# Patient Record
Sex: MALE | Race: WHITE
[De-identification: names, ages, dates, MRNs, and addresses within clinical notes are randomized per-mention and may not be internally consistent; named-entity substitution may affect disease eponyms.]

---

## 2018-01-04 ENCOUNTER — HOSPITAL ENCOUNTER (EMERGENCY)
Dept: HOSPITAL 47 - EC | Age: 53
Discharge: HOME | End: 2018-01-04
Payer: COMMERCIAL

## 2018-01-04 VITALS
TEMPERATURE: 97.5 F | SYSTOLIC BLOOD PRESSURE: 135 MMHG | RESPIRATION RATE: 16 BRPM | HEART RATE: 72 BPM | DIASTOLIC BLOOD PRESSURE: 85 MMHG

## 2018-01-04 DIAGNOSIS — Z23: ICD-10-CM

## 2018-01-04 DIAGNOSIS — T54.3X1A: Primary | ICD-10-CM

## 2018-01-04 PROCEDURE — 90715 TDAP VACCINE 7 YRS/> IM: CPT

## 2018-01-04 PROCEDURE — 99283 EMERGENCY DEPT VISIT LOW MDM: CPT

## 2018-01-04 PROCEDURE — 90471 IMMUNIZATION ADMIN: CPT
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
## 2021-08-31 ENCOUNTER — HOSPITAL ENCOUNTER (OUTPATIENT)
Dept: HOSPITAL 47 - RADXRMAIN | Age: 56
Discharge: HOME | End: 2021-08-31
Attending: FAMILY MEDICINE
Payer: COMMERCIAL

## 2021-08-31 DIAGNOSIS — Z86.16: ICD-10-CM

## 2021-08-31 DIAGNOSIS — R06.02: Primary | ICD-10-CM

## 2021-08-31 PROCEDURE — 71046 X-RAY EXAM CHEST 2 VIEWS: CPT

## 2021-08-31 NOTE — XR
EXAMINATION TYPE: XR chest 2V

 

DATE OF EXAM: 8/31/2021

 

COMPARISON: NONE

 

HISTORY: Covid pneumonia 8 months prior, decreased lung capacity on pulmonary function tests, shortne
ss of breath

 

TECHNIQUE:  Frontal and lateral views of the chest are obtained.

 

FINDINGS:  There is some strand-like increased attenuation at the right lung base. Patient is rotated
. Question some nodularity in the left upper lobe There is no evident pneumothorax or pleural effusio
n. Cardiac mediastinal silhouette is within normal limits.

 

IMPRESSION:  There may be some basilar atelectasis or scarring. Possible lung nodules left upper lobe
. Consider chest CT.

 

A Yellow level critical message alert has been initiated for Jeff Fuentes DO via the [facility] 
Critical Results System on 8/31/2021 12:21 PM.  This message alert has been sent to Jeff Fuentes DO vi
a the preferences provided by the clinician for the receipt of Radiology Critical Findings. Message I
D [id number]. .

## 2021-09-13 ENCOUNTER — HOSPITAL ENCOUNTER (OUTPATIENT)
Dept: HOSPITAL 47 - RADCTMAIN | Age: 56
Discharge: HOME | End: 2021-09-13
Attending: FAMILY MEDICINE
Payer: COMMERCIAL

## 2021-09-13 DIAGNOSIS — R91.8: ICD-10-CM

## 2021-09-13 DIAGNOSIS — I31.3: ICD-10-CM

## 2021-09-13 DIAGNOSIS — U07.1: Primary | ICD-10-CM

## 2021-09-13 PROCEDURE — 71260 CT THORAX DX C+: CPT

## 2021-09-13 NOTE — CT
EXAMINATION TYPE: CT chest w con

 

DATE OF EXAM: 9/13/2021

 

COMPARISON: Chest x-ray August 31, 2021

 

HISTORY: SOB.  Cough.  Positive COVID diagnosis in December 2020

 

CT DLP: 394.30 mGycm.   Automated Exposure Control for Dose Reduction was Utilized.

 

TECHNIQUE:  CT scan of the thorax is performed following with IV Contrast, patient injected with 100 
ml mL of Isovue 300.  

 

FINDINGS:

 

LUNGS: There is mild to moderate bilateral nodularity along the pleura in the lung bases. There is mo
re focal elongated nodular pleural thickening or more likely subpleural nodular consolidation axial i
mage 38 at 2.6 x 0.8 cm. There are additional scattered pulmonary nodules bilaterally. For references
 a 6 mm left mid lung nodule axial image 25. 4) a 5 to 6 mm lateral left upper lobe nodule axial imag
e 19. There is a 8 x 6 mm spiculated nodule or scarlike opacity in the left upper lobe axial image 16
. For reference a 6 x 4 mm right mid lung nodule axial image 29. No pleural effusion or pneumothorax 
seen bilaterally. Tracheobronchial tree is patent.

 

MEDIASTINUM: There are calcified right tracheobronchial lymph nodes. There are prominent noncalcified
 and calcified right hilar lymph nodes. There is prominent noncalcified left hilar and subcarinal lym
ph nodes. For reference there is subcarinal lymph node measuring 2.2 x 1.3 cm axial image 26.

 

No cardiomegaly. Tiny pericardial effusion is seen.  There is left-sided arch with aberrant right bra
chiocephalic artery running posterior to the esophagus, normal variant. There is near 1.0 cm mid pole
 left thyroid hypodense nodule coronal image 49 midpole level.

 

OTHER: There is thin walled 2.3 cm cyst posteriorly right hepatic lobe axial image 56 and 2.5 cm post
erior liver axial image 65. Moderate right lateral spurring midthoracic spine.

 

IMPRESSION: Unusual pattern with peripheral reticular and reticulonodular appearance just above the d
iaphragm with multiple subpleural nodules and scattered pulmonary nodules bilaterally. There are prom
inent hypodense and hyperdense thoracic lymph nodes. Metastatic disease cannot be excluded. Correlate
 clinically. Consider PET CT follow-up.

## 2021-11-05 ENCOUNTER — HOSPITAL ENCOUNTER (OUTPATIENT)
Dept: HOSPITAL 47 - RADPETMAIN | Age: 56
Discharge: HOME | End: 2021-11-05
Attending: INTERNAL MEDICINE
Payer: COMMERCIAL

## 2021-11-05 DIAGNOSIS — R91.8: Primary | ICD-10-CM

## 2021-11-05 PROCEDURE — 78815 PET IMAGE W/CT SKULL-THIGH: CPT

## 2021-11-08 NOTE — PE
EXAMINATION TYPE: PET CT fusion skull to thigh

 

DATE OF EXAM: 11/5/2021

 

COMPARISON: Chest CT September 13, 2021

 

HISTORY: Abnormal CT. Solitary pulmonary nodule.   

 

TECHNIQUE:  Following the intravenous administration of 9.85 mCi of F-18 FDG, whole body images are p
erformed from the skull base to the midthigh.  Images are reviewed on the computer in the coronal, ax
ial, and sagittal planes.  Reconstructed rotating images are created on independent workstation and r
eviewed on the computer.   A localization and attenuation correction CT is performed in conjunction w
ith the PET scan. Blood glucose level equals 108.

 

SCAN: Initial Scan

 

FINDINGS: 

 

SKULL BASE AND NECK:  No areas of abnormal hypermetabolic uptake. 

 

CHEST, MEDIASTINUM, AND HILAR REGION: Persistent scattered small nodules and atelectatic changes in t
he lower lungs with more nodular appearance just above the diaphragms. For reference there is 15 by 8
mm nodule or nodular consolidation medial left lower lobe axial image 109 with mild hypermetabolic up
take. 

 

No suspicious hypermetabolic uptake in thoracic lymph nodes.

 

ABDOMEN AND PELVIS: Abnormal 1.5 x 1.3 cm lymph node just inferior to caudate lobe of liver near leve
l of pancreatic head and portal venous confluence axial image 140, max SUV is 5.49. Abnormal lymph no
de inferior and to right of this between pancreatic duodenal groove measuring 1.5 x 1.3 cm axial imag
e 145, max SUV is 4.8.

 

OSSEOUS STRUCTURES: No abnormal hypermetabolic uptake.

 

OTHER CT: Persistent cardiomegaly. Persistent left arch with aberrant right brachiocephalic artery ru
nning posterior to the esophagus. Calcified thoracic lymph nodes redemonstrated. Low lung volumes red
emonstrated.

 

Liver is heterogeneously hypodense consistent with diffuse fatty infiltration. A few benign thin-wall
ed cysts scattered throughout the liver are redemonstrated. Sigmoid colonic diverticulosis. Mildly en
larged prostate with central calcifications.

 

IMPRESSION: Persistent lower lung nodularity worrisome for metastatic disease. There are 2 abnormal l
ymph nodes in the upper to mid abdomen periPancreatic region.

## 2023-07-06 ENCOUNTER — HOSPITAL ENCOUNTER (OUTPATIENT)
Dept: HOSPITAL 47 - ORWHC2ENDO | Age: 58
Discharge: HOME | End: 2023-07-06
Attending: SURGERY
Payer: COMMERCIAL

## 2023-07-06 VITALS — DIASTOLIC BLOOD PRESSURE: 89 MMHG | SYSTOLIC BLOOD PRESSURE: 135 MMHG | HEART RATE: 57 BPM

## 2023-07-06 VITALS — TEMPERATURE: 96.9 F

## 2023-07-06 VITALS — RESPIRATION RATE: 16 BRPM

## 2023-07-06 DIAGNOSIS — Z79.899: ICD-10-CM

## 2023-07-06 DIAGNOSIS — Z12.11: Primary | ICD-10-CM

## 2023-07-06 DIAGNOSIS — K57.30: ICD-10-CM

## 2023-07-06 DIAGNOSIS — I10: ICD-10-CM

## 2023-07-06 PROCEDURE — 45378 DIAGNOSTIC COLONOSCOPY: CPT

## 2023-07-06 NOTE — P.OP
Date of Procedure: 07/06/23


Preoperative Diagnosis: 


Screening colonoscopy


Postoperative Diagnosis: 


Diverticulosis


Procedure(s) Performed: 


Colonoscopy


Anesthesia: MAC


Surgeon: Tien Mcmahon


Pathology: none sent


Condition: stable


Disposition: PACU


Description of Procedure: 


Patient's placed on the endoscopy table in the lateral position.  He received IV

sedation.  Digital rectal exam performed.  This revealed no ebonized.  Flexible 

colonoscope was then placed patient anus and passed throughout the entire colon.

 The ileocecal valve was visualized.  The cecum, ascending and transverse colon 

appeared normal.  In the descending and sigmoid colon there were moderate 

diverticular changes.  There was no evidence of diverticulitis.  The scope was 

then brought back the rectum and this appeared normal.  The scope was withdrawn 

for patient.

## 2023-07-06 NOTE — P.GSHP
History of Present Illness


H&P Date: 07/06/23


Chief Complaint: Screening colonoscopy





This a 57-year-old male presents today for screening colonoscopy.  Patient 

denies a significant GI complaints.  His last colonoscopy was 13 years ago.





Past Medical History


Past Medical History: Hypertension


History of Any Multi-Drug Resistant Organisms: None Reported


Past Surgical History: No Surgical Hx Reported


Past Anesthesia/Blood Transfusion Reactions: No Reported Reaction


Smoking Status: Never smoker





Medications and Allergies


                                Home Medications











 Medication  Instructions  Recorded  Confirmed  Type


 


amLODIPine 10 mg PO DAILY 07/06/23 07/06/23 History








                                    Allergies











Allergy/AdvReac Type Severity Reaction Status Date / Time


 


No Known Allergies Allergy   Verified 07/03/23 08:31














Surgical - Exam


                                   Vital Signs











Temp Pulse Resp BP Pulse Ox


 


 96.9 F L  65   18   130/84   96 


 


 07/06/23 07:57  07/06/23 07:57  07/06/23 07:57  07/06/23 07:57  07/06/23 07:57














- General


well developed, well nourished, no distress





- Eyes


PERRL





- ENT


normal pinna





- Neck


no masses





- Respiratory


normal expansion





- Cardiovascular


Rhythm: regular





- Abdomen


Abdomen: soft, non tender





Assessment and Plan


Assessment: 





We'll perform screening colonoscopy.